# Patient Record
Sex: FEMALE | Race: BLACK OR AFRICAN AMERICAN | Employment: UNEMPLOYED | URBAN - METROPOLITAN AREA
[De-identification: names, ages, dates, MRNs, and addresses within clinical notes are randomized per-mention and may not be internally consistent; named-entity substitution may affect disease eponyms.]

---

## 2020-08-26 ENCOUNTER — HOSPITAL ENCOUNTER (EMERGENCY)
Age: 20
Discharge: HOME | End: 2020-08-26
Attending: EMERGENCY MEDICINE

## 2020-08-26 VITALS
TEMPERATURE: 97.8 F | RESPIRATION RATE: 14 BRPM | DIASTOLIC BLOOD PRESSURE: 80 MMHG | HEIGHT: 68 IN | WEIGHT: 127.4 LBS | HEART RATE: 56 BPM | OXYGEN SATURATION: 100 % | BODY MASS INDEX: 19.31 KG/M2 | SYSTOLIC BLOOD PRESSURE: 140 MMHG

## 2020-08-26 PROCEDURE — 99282 EMERGENCY DEPT VISIT SF MDM: CPT

## 2020-08-26 SDOH — HEALTH STABILITY: MENTAL HEALTH: HOW OFTEN DO YOU HAVE A DRINK CONTAINING ALCOHOL?: NEVER

## 2020-08-26 NOTE — ED NOTES
Patient has a healing wound to her left forearm. She states that two weeks ago she got a tattoo in that area where the wound is. She states that about a week ago she noticed something there and thought it was maybe a bug bite. She states that at one point pus did come out of it. She also complains of it itching and bothering her when she bumps it. The wound appears to be healing and looks well. No redness, warmth, or drainage is noted at this time.       Chau White RN  08/26/20 5611

## 2020-08-26 NOTE — ED NOTES
Patient verbalized understanding of d/c instructions. Patient given scripts x 0. Patient left the ED and instructed on follow up.         Willow Bell RN  08/26/20 4066

## 2020-08-26 NOTE — ED PROVIDER NOTES
CHIEF COMPLAINT  Wound Check      HISTORY OF PRESENT ILLNESS  Kiera Barry is a 20 y.o. female, who presents to the ED with a persistent but improving skin wound for 2 weeks. Patient states she had a revision of a tattoo on her left forearm 2 weeks ago and soon after the procedure she noted redness, swelling, pain, purulent drainage from the wound. She has been cleansing the wound and using topical antibacterial ointment on the wound with improvement of redness, swelling. No recent drainage noted. No fever. Patient denies other injuries. Pain is mild at present. Review of Systems    I have reviewed the following from the nursing documentation. No past medical history on file. No past surgical history on file. No family history on file.   Social History     Socioeconomic History    Marital status: Single     Spouse name: Not on file    Number of children: Not on file    Years of education: Not on file    Highest education level: Not on file   Occupational History    Not on file   Social Needs    Financial resource strain: Not on file    Food insecurity     Worry: Not on file     Inability: Not on file    Transportation needs     Medical: Not on file     Non-medical: Not on file   Tobacco Use    Smoking status: Never Smoker    Smokeless tobacco: Never Used   Substance and Sexual Activity    Alcohol use: Never     Frequency: Never    Drug use: Never    Sexual activity: Not on file   Lifestyle    Physical activity     Days per week: Not on file     Minutes per session: Not on file    Stress: Not on file   Relationships    Social connections     Talks on phone: Not on file     Gets together: Not on file     Attends Gnosticist service: Not on file     Active member of club or organization: Not on file     Attends meetings of clubs or organizations: Not on file     Relationship status: Not on file    Intimate partner violence     Fear of current or ex partner: Not on file     Emotionally abused: Not on file     Physically abused: Not on file     Forced sexual activity: Not on file   Other Topics Concern    Not on file   Social History Narrative    Not on file     No current facility-administered medications for this encounter. No current outpatient medications on file. No Known Allergies       PHYSICAL EXAM  BP (!) 140/80   Pulse 56   Temp 97.8 °F (36.6 °C) (Oral)   Resp 14   Ht 5' 8\" (1.727 m)   Wt 127 lb 6.4 oz (57.8 kg)   LMP 08/19/2020   SpO2 100%   BMI 19.37 kg/m²   Physical Exam   GENERAL APPEARANCE: Awake and alert. Cooperative. In no acute distress. EYES: PERRL. Corneas clear. Sclera non icteric. No conjunctival injection. EXTREMITIES:  Moves all extremities equally. Examination of the left upper extremity shows a 2 mm circular healing wound with mild erythema no fluctuance no drainage no advancing streak no crepitus. No tenderness to palpation. SKIN: Warm and dry. LABORATORY STUDIES:   Labs Reviewed - No data to display     RADIOLOGY  No orders to display       If EKG done, EKG was interpreted independently by me    PROCEDURES  Procedures    EDCOURSE/MDM  Patient seen and evaluated. Old records selectively reviewed if pertinent. Labs and imaging reviewed and results discussed with patient. I considered healing cellulitis. Wound is closed with minimal findings. Patient symptoms are improving. Patient advised to continue with local wound care. Other discharge instructions are as noted. If Adela Cabrales is discharged, I discussed with Adela Cabrales and/or family the exam results, diagnosis, care, prognosis, reasons to return and the importance of follow up. Patient and/or family is in agreement with plan and all questions have been answered. Specific discharge instructions explained, including reasons to return to the emergency department. If discharged, patient was given scripts for the following medications.   There are no discharge

## 2020-08-26 NOTE — ED NOTES
CHIEF COMPLAINT  Wound Check      HISTORY OF PRESENT ILLNESS  Leonel Grandchild is a 20 y.o. female, who presents to the ED with left forearm abscess. She had a tattoo touched up about 2 weeks ago. She is noticed edema, erythema, and purulent discharge. Last time she saw discharge was on Saturday. She has been using Neosporin with good response. Arm is no longer painful. It is still pruritic. She states she just wanted to have it checked to make sure there was nothing wrong with it. She denies any fevers, chills, or other symptoms. Review of Systems    I have reviewed the following from the nursing documentation. No past medical history on file. No past surgical history on file. No family history on file.   Social History     Socioeconomic History    Marital status: Not on file     Spouse name: Not on file    Number of children: Not on file    Years of education: Not on file    Highest education level: Not on file   Occupational History    Not on file   Social Needs    Financial resource strain: Not on file    Food insecurity     Worry: Not on file     Inability: Not on file    Transportation needs     Medical: Not on file     Non-medical: Not on file   Tobacco Use    Smoking status: Never Smoker    Smokeless tobacco: Never Used   Substance and Sexual Activity    Alcohol use: Never     Frequency: Never    Drug use: Never    Sexual activity: Not on file   Lifestyle    Physical activity     Days per week: Not on file     Minutes per session: Not on file    Stress: Not on file   Relationships    Social connections     Talks on phone: Not on file     Gets together: Not on file     Attends Anglican service: Not on file     Active member of club or organization: Not on file     Attends meetings of clubs or organizations: Not on file     Relationship status: Not on file    Intimate partner violence     Fear of current or ex partner: Not on file     Emotionally abused: Not on file Physically abused: Not on file     Forced sexual activity: Not on file   Other Topics Concern    Not on file   Social History Narrative    Not on file     No current facility-administered medications for this encounter. No current outpatient medications on file. No Known Allergies       PHYSICAL EXAM  BP (!) 140/80   Pulse 56   Temp 97.8 °F (36.6 °C) (Oral)   Resp 14   Ht 5' 8\" (1.727 m)   Wt 127 lb 6.4 oz (57.8 kg)   LMP 08/19/2020   SpO2 100%   BMI 19.37 kg/m²   Physical Exam   GENERAL APPEARANCE: Awake and alert. Cooperative. In no acute distress. EYES: PERRL. Corneas clear. Sclera non icteric. No conjunctival injection  ENT: Oropharynx clear. Airway patent. No stridor. No asymmetry. NECK: Supple  LUNGS: Clear. Equal breath sounds bilaterally. CARDIOVASCULAR: RRR. No murmurs rubs or gallops. ABDOMEN: Soft non tender. No guarding or rebound. EXTREMITIES:  Moves all extremities equally. SKIN: Left forearm with healing eschar. No edema, no surrounding erythema, induration, discharge, or warmth. NEURO: Alert and oriented x3. Strength 5/5 throughout. LABORATORY STUDIES:   Labs Reviewed - No data to display     RADIOLOGY  No orders to display       If EKG done, EKG was interpreted independently by me    PROCEDURES  Procedures    EDCOURSE/MDM  Patient seen and evaluated. Old records selectively reviewed if pertinent. Labs and imaging reviewed and results discussed with patient. I considered cellulitis, abscess, osteomyelitis. I believe that there is LOW LIKELIHOOD of abscess or osteomyelitis. It appears patient responded to over-the-counter Neosporin. No need for antibiotics at this time. Patient will continue Neosporin until eschar is healed. She can use topical Benadryl for itching. If Nazia Frias is discharged, I discussed with Nazia Frias and/or family the exam results, diagnosis, care, prognosis, reasons to return and the importance of follow up.  Patient and/or family is in agreement with plan and all questions have been answered. Specific discharge instructions explained, including reasons to return to the emergency department. If discharged, patient was given scripts for the following medications. New Prescriptions    No medications on file       CLINICAL IMPRESSION  1. Cellulitis of left upper extremity        BP (!) 140/80   Pulse 56   Temp 97.8 °F (36.6 °C) (Oral)   Resp 14   Ht 5' 8\" (1.727 m)   Wt 127 lb 6.4 oz (57.8 kg)   LMP 08/19/2020   SpO2 100%   BMI 19.37 kg/m²     DISPOSITION  Yonis Hansen was discharged home in stable condition.      Yojana Forte, [address] [place] [address],   Resident  08/26/20 5500